# Patient Record
Sex: MALE | Race: WHITE | NOT HISPANIC OR LATINO | Employment: UNEMPLOYED | URBAN - METROPOLITAN AREA
[De-identification: names, ages, dates, MRNs, and addresses within clinical notes are randomized per-mention and may not be internally consistent; named-entity substitution may affect disease eponyms.]

---

## 2024-01-23 ENCOUNTER — OFFICE VISIT (OUTPATIENT)
Dept: OTOLARYNGOLOGY | Facility: CLINIC | Age: 54
End: 2024-01-23
Payer: COMMERCIAL

## 2024-01-23 ENCOUNTER — CLINICAL SUPPORT (OUTPATIENT)
Dept: AUDIOLOGY | Facility: CLINIC | Age: 54
End: 2024-01-23
Payer: COMMERCIAL

## 2024-01-23 VITALS — WEIGHT: 205 LBS | BODY MASS INDEX: 32.95 KG/M2 | HEIGHT: 66 IN

## 2024-01-23 DIAGNOSIS — H90.3 ASYMMETRIC SNHL (SENSORINEURAL HEARING LOSS): Primary | ICD-10-CM

## 2024-01-23 DIAGNOSIS — H69.91 DYSFUNCTION OF RIGHT EUSTACHIAN TUBE: ICD-10-CM

## 2024-01-23 DIAGNOSIS — H61.21 EXCESSIVE EAR WAX, RIGHT: ICD-10-CM

## 2024-01-23 DIAGNOSIS — H90.3 SENSORINEURAL HEARING LOSS (SNHL) OF BOTH EARS: Primary | ICD-10-CM

## 2024-01-23 PROBLEM — S21.132A: Status: ACTIVE | Noted: 2023-05-30

## 2024-01-23 PROBLEM — I25.10 CORONARY ARTERY DISEASE INVOLVING NATIVE CORONARY ARTERY OF NATIVE HEART WITHOUT ANGINA PECTORIS: Status: ACTIVE | Noted: 2023-06-15

## 2024-01-23 PROBLEM — I11.0 BENIGN HYPERTENSIVE HEART DISEASE WITH HEART FAILURE (MULTI): Status: ACTIVE | Noted: 2023-06-15

## 2024-01-23 PROBLEM — R45.7 EMOTIONAL STRESS: Status: ACTIVE | Noted: 2023-05-31

## 2024-01-23 PROBLEM — I50.23 ACUTE ON CHRONIC SYSTOLIC HEART FAILURE (MULTI): Status: ACTIVE | Noted: 2023-06-15

## 2024-01-23 PROCEDURE — 92550 TYMPANOMETRY & REFLEX THRESH: CPT | Performed by: AUDIOLOGIST

## 2024-01-23 PROCEDURE — 92557 COMPREHENSIVE HEARING TEST: CPT | Performed by: AUDIOLOGIST

## 2024-01-23 PROCEDURE — 99204 OFFICE O/P NEW MOD 45 MIN: CPT | Performed by: STUDENT IN AN ORGANIZED HEALTH CARE EDUCATION/TRAINING PROGRAM

## 2024-01-23 RX ORDER — CETIRIZINE HYDROCHLORIDE 10 MG/1
10 TABLET, CHEWABLE ORAL DAILY
COMMUNITY

## 2024-01-23 RX ORDER — VARENICLINE TARTRATE 0.5 (11)-1
0.5 KIT ORAL 2 TIMES DAILY
COMMUNITY
Start: 2024-01-22

## 2024-01-23 RX ORDER — AMLODIPINE BESYLATE 2.5 MG/1
2.5 TABLET ORAL
COMMUNITY
Start: 2023-10-13 | End: 2024-10-12

## 2024-01-23 RX ORDER — CARVEDILOL 12.5 MG/1
12.5 TABLET ORAL
COMMUNITY
Start: 2024-01-22 | End: 2025-01-21

## 2024-01-23 RX ORDER — LOSARTAN POTASSIUM 50 MG/1
50 TABLET ORAL
COMMUNITY
Start: 2023-10-13 | End: 2024-10-12

## 2024-01-23 NOTE — PROGRESS NOTES
HPI  Jourdan Frausto is a 53 y.o. male presenting for initial evaluation of hearing loss.  The patient reports developing bilateral progressive hearing loss for years, has difficulty understanding speech in high background noise environments.  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  Denies ear pain, vertigo, discharge from ear, tinnitus, aural fullness or autophony.   Denies history of prior ear surgery.      History reviewed. No pertinent past medical history.    History reviewed. No pertinent surgical history.      Current Outpatient Medications on File Prior to Visit   Medication Sig Dispense Refill    amLODIPine (Norvasc) 2.5 mg tablet Take 1 tablet (2.5 mg) by mouth once daily.      carvedilol (Coreg) 12.5 mg tablet Take 1 tablet (12.5 mg) by mouth 2 times a day with meals.      losartan (Cozaar) 50 mg tablet Take 1 tablet (50 mg) by mouth once daily.      varenicline (Chantix BRAYDON) 0.5 mg (11)- 1 mg (42) tablet Take 0.5 mg by mouth 2 times a day.      cetirizine (ZyrTEC) 10 mg chewable tablet Chew 1 tablet (10 mg) once daily.       No current facility-administered medications on file prior to visit.        No Known Allergies     Review of Systems  A detailed 12 point ROS was performed and is negative except as noted in the intake form, HPI and/or Past Medical History        Physical Exam   CONSTITUTIONAL: Well developed, well nourished.  VOICE: Normal voice quality  RESPIRATION: Breathing comfortably, no stridor.  CV: No clubbing/cyanosis/edema in hands.  EYES: EOM Intact, sclera normal.  NEURO: Alert and oriented times 3, Cranial nerves V,VII intact and symmetric bilaterally.  HEAD AND FACE: Symmetric facial features, no masses or lesions, sinuses nontender to palpation.  SALIVARY GLANDS: Parotid and submandibular glands normal bilaterally.  + EARS: Normal external ears  Right EAC with cerumen obstruction (removed)  Right EAC patent, tympanic membrane intact  Left EAC patent,  tympanic membrane intact  NOSE: External nose midline, anterior rhinoscopy is normal with limited visualization to the anterior aspect of the interior turbinates. No lesions noted.  ORAL CAVITY/OROPHARYNX/LIPS: Normal mucous membranes, normal floor of mouth/tongue/OP, no masses or lesions are noted.  PHARYNGEAL WALLS AND NASOPHARYNX: No masses noted. Mucosa appears clean and moist  NECK/LYMPH: No LAD, no thyroid masses. Trachea palpably midline  SKIN: Neck skin is without injury  PSYCH: Alert and oriented with appropriate mood and affect     Procedure: Removal of impacted cerumen, right  Indication: Cerumen impaction.   The procedure was reviewed and explained.  Verbal consent was obtained.  With the use of the otoscope the right ear was examined, cerumen was cleaned with the use of curettes.  The patient tolerated the procedure well.       Results   Audiogram performed today reviewed  Right normal hearing up to 4000 Hz sloping to severe sensorineural hearing loss.  Speech discrimination score 100%.  Type C tympanogram.  Left: Normal hearing up to 3000 Hz sloping to severe sensorineural hearing loss.  Asymmetry noted at 4006 1000 Hz.  Speech discrimination score 96%.  Type a tympanogram.        Assessment  Asymmetric sensorineural hearing loss    Plan  54-year-old male presenting for evaluation of bilateral progressive hearing loss.  Audiogram performed today notable for high-frequency asymmetry.  The condition was reviewed and explained, multiple diagnostic modalities discussed, we will proceed with ABR for evaluation of retrocochlear pathology.  RTC 4w

## 2024-01-23 NOTE — PROGRESS NOTES
COMPREHENSIVE AUDIOMETRIC EVALUATION      Name:  Jourdan Frausto  :  1970  Age:  53 y.o.  Date of Evaluation:  24   Referring Provider:  Dr. Long     History:  Mr. Frausto was seen today  for an evaluation of hearing.  Patient reported concern for hearing loss occurring gradually over time.  Patient reported hearing sensitivity in the left is better than in the right.  When asked, patient denied otalgia, aural fullness, and tinnitus    See audiometric evaluation at end of this report or scanned under media tab    OTOSCOPY:       Right Ear: Minimal non-occluding cerumen       Left Ear: Minimal non-occluding cerumen    226 Hz TYMPANOMETRY:       Right Ear: Type C: Negative pressure with normal compliance and ear canal volume, consistent with eustachian tube dysfunction       Left Ear: Type A: normal peak pressure, compliance, and ear canal volume, consistent with middle ear function within normal limits    AUDIOMETRIC EVALUATION (Phones, Checked with inserts):       Right Ear: Mild rising to WNL through 3000 Hz sloping to Severe, Sensorineural hearing loss                 Left Ear: Mild rising to WNL through 3000 Hz sloping to Severe, Sensorineural hearing loss       NOTE: Asymmetry at 6000 Hz        Test technique:  Standard Audiometry  Reliability:   good    SPEECH RECOGNITION THRESHOLD:       Right Ear:  20 dBHL in good agreement with PTA       Left Ear:  10 dBHL in good agreement with PTA    WORD RECOGNITION:       Right Ear:  excellent (100%) at normal presentation level       Left Ear:  excellent (96%) at normal presentation level    IPSILATERAL ACOUSTIC REFLEXES:       Right Ear:  Essentially Absent from 500-2000 Hz, with the exception of present at 100 dB HL at 1000 Hz, which may be consistent with concerns for retrocochlear involvement or right middle ear involvement       Left Ear:    Absent from 500-2000 Hz which may be consistent with concerns for retrocochlear involvement or borderline left  middle ear involvement    CONTRALATERAL REFLEXES:       Probe Right (Stimulus Left):  Absent from 500-2000 Hz which may be consistent with concerns for retrocochlear involvement or right middle ear involvement       Probe Left (Stimulus Right):    Absent from 500-2000 Hz which may be consistent with concerns for retrocochlear involvement or right middle ear involvement    ACOUSTIC REFLEX DECAY:       Probe Right (Stimulus Left): CNT       Probe Left (Stimulus Right): CNT    DISCUSSION:   Discussed results and recommendations with patient.  Questions were addressed and the patient was encouraged to contact our department should concerns arise.    RECOMMENDATIONS:  -Recommend further investigation into clinically significant asymmetry  -Recommend patient return following any medical management for repeated evaluation and repetition of acoustic reflexes    Ryan Stark, CCC-A     Appt: 8:30 - 9:00 AM

## 2024-03-05 ENCOUNTER — CLINICAL SUPPORT (OUTPATIENT)
Dept: AUDIOLOGY | Facility: CLINIC | Age: 54
End: 2024-03-05
Payer: MEDICAID

## 2024-03-05 ENCOUNTER — OFFICE VISIT (OUTPATIENT)
Dept: OTOLARYNGOLOGY | Facility: CLINIC | Age: 54
End: 2024-03-05
Payer: MEDICAID

## 2024-03-05 VITALS — BODY MASS INDEX: 33.75 KG/M2 | HEIGHT: 66 IN | WEIGHT: 210 LBS

## 2024-03-05 DIAGNOSIS — K13.79 LESION OF SOFT PALATE: ICD-10-CM

## 2024-03-05 DIAGNOSIS — J39.2 OROPHARYNGEAL LESION: ICD-10-CM

## 2024-03-05 DIAGNOSIS — H90.3 ASYMMETRIC SNHL (SENSORINEURAL HEARING LOSS): Primary | ICD-10-CM

## 2024-03-05 DIAGNOSIS — H90.3 SENSORINEURAL HEARING LOSS (SNHL) OF BOTH EARS: Primary | ICD-10-CM

## 2024-03-05 PROBLEM — E78.5 HLD (HYPERLIPIDEMIA): Status: ACTIVE | Noted: 2023-06-02

## 2024-03-05 PROBLEM — R73.9 ELEVATED SERUM GLUCOSE: Status: ACTIVE | Noted: 2023-05-30

## 2024-03-05 PROBLEM — R06.02 SHORTNESS OF BREATH: Status: ACTIVE | Noted: 2024-02-29

## 2024-03-05 PROCEDURE — 4004F PT TOBACCO SCREEN RCVD TLK: CPT | Performed by: STUDENT IN AN ORGANIZED HEALTH CARE EDUCATION/TRAINING PROGRAM

## 2024-03-05 PROCEDURE — 92653 AEP NEURODIAGNOSTIC I&R: CPT | Performed by: AUDIOLOGIST

## 2024-03-05 PROCEDURE — 92588 EVOKED AUDITORY TST COMPLETE: CPT | Performed by: AUDIOLOGIST

## 2024-03-05 PROCEDURE — 92567 TYMPANOMETRY: CPT | Performed by: AUDIOLOGIST

## 2024-03-05 PROCEDURE — 99214 OFFICE O/P EST MOD 30 MIN: CPT | Performed by: STUDENT IN AN ORGANIZED HEALTH CARE EDUCATION/TRAINING PROGRAM

## 2024-03-05 PROCEDURE — 31575 DIAGNOSTIC LARYNGOSCOPY: CPT | Performed by: STUDENT IN AN ORGANIZED HEALTH CARE EDUCATION/TRAINING PROGRAM

## 2024-03-05 RX ORDER — ROSUVASTATIN CALCIUM 40 MG/1
40 TABLET, COATED ORAL DAILY
COMMUNITY
Start: 2024-02-28 | End: 2025-02-27

## 2024-03-05 RX ORDER — OMEPRAZOLE 20 MG/1
20 CAPSULE, DELAYED RELEASE ORAL
COMMUNITY

## 2024-03-05 NOTE — PROGRESS NOTES
NEURODIAGNOSTIC AUDITORY BRAINSTEM RESPONSE (ABR) EVALUATION    Name:  Jourdan Frausto  :  1970  Age:  53 y.o.  Date of Evaluation:  24   Referring Physician: Dr. Long      DISCUSSION:  Results of today's neurodiagnostic evaluation were ABNORMAL, which may warrant further investigation into retrocochlear pathology.    OTOSCOPY:  Right: Clear but red canal  Left: Clear but red canal    226 Hz TYMPANOMETRY:       Right Ear: Type A: normal peak pressure, compliance, and ear canal volume, consistent with middle ear function within normal limits       Left Ear: Type A: normal peak pressure, compliance, and ear canal volume, consistent with middle ear function within normal limits    Probe Ear/Tone: 226 Right Left   Tympanogram Type A A   Peak Pressure in daPa -79 -64   Static Compliance in ml 0.49 0.28   Equivalent Canal Volume 1.0 1.4     NOTE: Tympanometry conducted due to abnormal findings and type C tympanometry at last evaluation; today's findings are INCONSISTENT  with conductive pathology    DISTORTION PRODUCT OTOACOUSTIC EMISSIONS (DPOAEs):  Right: Present and robust from 1500 to 4000 Hz and 8000 Hz; largely consistent with patent behavioral hearing sensitivity  Left:  Present and robust from 1500 to 4000 Hz; largely consistent with patent behavioral hearing sensitivity    NOTE: Present DPOAEs are typically consistent with no greater than a mild hearing loss and OHC function of the cochlea within normal limits; interpret absent low frequency DPOAEs with caution due to high level of biologic noise.    AUDITORY BRAINSTEM RESPONSE  Alternating stimuli; Rate 27.7/s; 90 dB nHL and 80 dB nHL    Right: Wave V outside of normal absolute latency, interpeak latencies, and fair morphology  Left: Wave V outside of normal absolute latency, interpeak latencies, and fair morphology    NOTE: Wave I slightly late though borderline within normal limits, elongation of interpeak latencies from I-III  binaurally.    RECOMMENDATIONS:  -Recommend further investigation to rule out retrocochlear involvement    Ryan Stark, CCC-A      Appt time: 10:00 - 10:45 AM

## 2024-03-05 NOTE — PROGRESS NOTES
HPI  3/5/24  Patient present for follow-up, JADE performed today was abnormal, reviewed and discussed with the patient.  Recall   Jourdan Frausto is a 53 y.o. male presenting for initial evaluation of hearing loss.  The patient reports developing bilateral progressive hearing loss for years, has difficulty understanding speech in high background noise environments.  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  Denies ear pain, vertigo, discharge from ear, tinnitus, aural fullness or autophony.   Denies history of prior ear surgery.      History reviewed. No pertinent past medical history.    History reviewed. No pertinent surgical history.      Current Outpatient Medications on File Prior to Visit   Medication Sig Dispense Refill    amLODIPine (Norvasc) 2.5 mg tablet Take 1 tablet (2.5 mg) by mouth once daily.      carvedilol (Coreg) 12.5 mg tablet Take 1 tablet (12.5 mg) by mouth 2 times a day with meals.      cetirizine (ZyrTEC) 10 mg chewable tablet Chew 1 tablet (10 mg) once daily.      losartan (Cozaar) 50 mg tablet Take 1 tablet (50 mg) by mouth once daily.      omeprazole (PriLOSEC) 20 mg DR capsule Take 1 capsule (20 mg) by mouth once daily in the morning. Take before meals.      rosuvastatin (Crestor) 40 mg tablet Take 1 tablet (40 mg) by mouth once daily.      varenicline (Chantix BRAYDON) 0.5 mg (11)- 1 mg (42) tablet Take 0.5 mg by mouth 2 times a day.       No current facility-administered medications on file prior to visit.        Allergies   Allergen Reactions    Bee Venom Protein (Honey Bee) Unknown        Review of Systems  A detailed 12 point ROS was performed and is negative except as noted in the intake form, HPI and/or Past Medical History        Physical Exam   CONSTITUTIONAL: Well developed, well nourished.  VOICE: Normal voice quality  RESPIRATION: Breathing comfortably, no stridor.  CV: No clubbing/cyanosis/edema in hands.  EYES: EOM Intact, sclera normal.  NEURO:  Alert and oriented times 3, Cranial nerves V,VII intact and symmetric bilaterally.  HEAD AND FACE: Symmetric facial features, no masses or lesions, sinuses nontender to palpation.  SALIVARY GLANDS: Parotid and submandibular glands normal bilaterally.  + EARS: Normal external ears  Right EAC with cerumen obstruction (removed)  Right EAC patent, tympanic membrane intact  Left EAC patent, tympanic membrane intact  NOSE: External nose midline, anterior rhinoscopy is normal with limited visualization to the anterior aspect of the interior turbinates. No lesions noted.  ORAL CAVITY/OROPHARYNX/LIPS: Normal mucous membranes, normal floor of mouth/tongue, no masses or lesions are noted.  + Papillomatous lesion along the left soft palate/lateral aspect of the uvula.  PHARYNGEAL WALLS AND NASOPHARYNX: No masses noted. Mucosa appears clean and moist  NECK/LYMPH: No LAD, no thyroid masses. Trachea palpably midline  SKIN: Neck skin is without injury  PSYCH: Alert and oriented with appropriate mood and affect     PROCEDURE NOTE:  FLEXIBLE NASOLARYNGOSCOPY     The risks, benefits, and alternatives were explained.  The patient wished to proceed and provided verbal consent.       INDICATIONS: To visualize anatomy in specific detail; evaluation of symptomatic disorder involving the voice, swallowing, or upper aerodigestive tract, including MYA.      DESCRIPTION OF PROCEDURE: After adequate afrin and lidocaine spray, I advanced the endoscope into the nasal cavity.  The nasal cavity, nasopharynx, tongue base, vallecula, posterior/lateral pharyngeal walls, pyriform, epiglottis, post cricoid area, and larynx were within normal limits.  The following specific findings should be noted:  No lesions/masses/ulcerations  Mobile TVCs bilaterally  No pooling of secretions     The patient tolerated the procedure without difficulty.      Results   Audiogram 1/23/24 reviewed  Right normal hearing up to 4000 Hz sloping to severe sensorineural hearing  loss.  Speech discrimination score 100%.  Type C tympanogram.  Left: Normal hearing up to 3000 Hz sloping to severe sensorineural hearing loss.  Asymmetry noted at 4006 1000 Hz.  Speech discrimination score 96%.  Type a tympanogram.  ABR 3/5/24 reviewed: Abnormal bilaterally.        Assessment  Asymmetric sensorineural hearing loss  Left oropharyngeal lesion    Plan  - Asymmetric sensorineural hearing loss  Audiogram1/23/24 notable for high-frequency asymmetry.    ABR 3/5/2024: Abnormal. We will proceed with MRI IAC to evaluate for retrocochlear pathology.    - Left oropharyngeal lesion  Papillomatous lesion noted along the lateral aspect of the uvula/left soft palate.  The condition was reviewed and explained, multiple treatment alternatives, risks and benefits discussed, the patient would like to proceed with excision.  States he will like to avoid general anesthesia, we will plan for excision in clinic.  The procedure was reviewed and explained, benefits, alternatives and risks discussed. Bleeding, infection, nerve injury resulting in numbness or weakness, VPI, scarring were discussed as possible risks. No guarantees were implied and the possibility of recurrence was discussed and understood.    RTC 6w

## 2024-03-21 ENCOUNTER — HOSPITAL ENCOUNTER (OUTPATIENT)
Dept: RADIOLOGY | Facility: CLINIC | Age: 54
Discharge: HOME | End: 2024-03-21
Payer: COMMERCIAL

## 2024-03-21 DIAGNOSIS — H90.3 ASYMMETRIC SNHL (SENSORINEURAL HEARING LOSS): ICD-10-CM

## 2024-03-21 PROCEDURE — A9575 INJ GADOTERATE MEGLUMI 0.1ML: HCPCS | Performed by: STUDENT IN AN ORGANIZED HEALTH CARE EDUCATION/TRAINING PROGRAM

## 2024-03-21 PROCEDURE — 70553 MRI BRAIN STEM W/O & W/DYE: CPT

## 2024-03-21 PROCEDURE — 70553 MRI BRAIN STEM W/O & W/DYE: CPT | Performed by: RADIOLOGY

## 2024-03-21 PROCEDURE — 2550000001 HC RX 255 CONTRASTS: Performed by: STUDENT IN AN ORGANIZED HEALTH CARE EDUCATION/TRAINING PROGRAM

## 2024-03-21 RX ORDER — GADOTERATE MEGLUMINE 376.9 MG/ML
0.2 INJECTION INTRAVENOUS
Status: COMPLETED | OUTPATIENT
Start: 2024-03-21 | End: 2024-03-21

## 2024-03-21 RX ADMIN — GADOTERATE MEGLUMINE 20 ML: 376.9 INJECTION INTRAVENOUS at 14:55

## 2024-05-03 ENCOUNTER — OFFICE VISIT (OUTPATIENT)
Dept: OTOLARYNGOLOGY | Facility: CLINIC | Age: 54
End: 2024-05-03
Payer: COMMERCIAL

## 2024-05-03 VITALS — WEIGHT: 230 LBS | HEIGHT: 66 IN | BODY MASS INDEX: 36.96 KG/M2

## 2024-05-03 DIAGNOSIS — K13.79 LESION OF SOFT PALATE: ICD-10-CM

## 2024-05-03 DIAGNOSIS — J39.2 OROPHARYNGEAL LESION: Primary | ICD-10-CM

## 2024-05-03 DIAGNOSIS — H90.3 ASYMMETRIC SNHL (SENSORINEURAL HEARING LOSS): ICD-10-CM

## 2024-05-03 PROBLEM — H61.20 EXCESSIVE CERUMEN IN EAR CANAL: Status: ACTIVE | Noted: 2024-05-03

## 2024-05-03 PROCEDURE — 99214 OFFICE O/P EST MOD 30 MIN: CPT | Performed by: STUDENT IN AN ORGANIZED HEALTH CARE EDUCATION/TRAINING PROGRAM

## 2024-05-03 PROCEDURE — 42104 EXCISION LESION MOUTH ROOF: CPT | Performed by: STUDENT IN AN ORGANIZED HEALTH CARE EDUCATION/TRAINING PROGRAM

## 2024-05-03 PROCEDURE — 88305 TISSUE EXAM BY PATHOLOGIST: CPT | Performed by: DENTIST

## 2024-05-03 PROCEDURE — 4004F PT TOBACCO SCREEN RCVD TLK: CPT | Performed by: STUDENT IN AN ORGANIZED HEALTH CARE EDUCATION/TRAINING PROGRAM

## 2024-05-03 PROCEDURE — 88305 TISSUE EXAM BY PATHOLOGIST: CPT

## 2024-05-03 RX ORDER — CHLORHEXIDINE GLUCONATE ORAL RINSE 1.2 MG/ML
15 SOLUTION DENTAL 3 TIMES DAILY
Qty: 225 ML | Refills: 0 | Status: SHIPPED | OUTPATIENT
Start: 2024-05-03 | End: 2024-05-08

## 2024-05-03 NOTE — PROGRESS NOTES
HPI  5/3/24  The patient present for follow-up, would like to proceed with excisional biopsy of left oropharyngeal lesion.  MRI IAC reviewed and discussed with the patient, no CPA/IAC lesions or masses noted.  Vascular loops noted adjacent to right CN 7/ 8 complex.   3/5/24  Patient present for follow-up, JADE performed today was abnormal, reviewed and discussed with the patient.  Recall   Jourdan Frausto is a 53 y.o. male presenting for initial evaluation of hearing loss.  The patient reports developing bilateral progressive hearing loss for years, has difficulty understanding speech in high background noise environments.  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  Denies ear pain, vertigo, discharge from ear, tinnitus, aural fullness or autophony.   Denies history of prior ear surgery.      History reviewed. No pertinent past medical history.    History reviewed. No pertinent surgical history.      Current Outpatient Medications on File Prior to Visit   Medication Sig Dispense Refill    amLODIPine (Norvasc) 2.5 mg tablet Take 1 tablet (2.5 mg) by mouth once daily.      carvedilol (Coreg) 12.5 mg tablet Take 1 tablet (12.5 mg) by mouth 2 times a day with meals.      cetirizine (ZyrTEC) 10 mg chewable tablet Chew 1 tablet (10 mg) once daily.      losartan (Cozaar) 50 mg tablet Take 1 tablet (50 mg) by mouth once daily.      omeprazole (PriLOSEC) 20 mg DR capsule Take 1 capsule (20 mg) by mouth once daily in the morning. Take before meals.      rosuvastatin (Crestor) 40 mg tablet Take 1 tablet (40 mg) by mouth once daily.      varenicline (Chantix BRAYDON) 0.5 mg (11)- 1 mg (42) tablet Take 0.5 mg by mouth 2 times a day.       No current facility-administered medications on file prior to visit.        Allergies   Allergen Reactions    Bee Venom Protein (Honey Bee) Unknown        Review of Systems  A detailed 12 point ROS was performed and is negative except as noted in the intake form, HPI  and/or Past Medical History        Physical Exam   CONSTITUTIONAL: Well developed, well nourished.  VOICE: Normal voice quality  RESPIRATION: Breathing comfortably, no stridor.  CV: No clubbing/cyanosis/edema in hands.  EYES: EOM Intact, sclera normal.  NEURO: Alert and oriented times 3, Cranial nerves V,VII intact and symmetric bilaterally.  HEAD AND FACE: Symmetric facial features, no masses or lesions, sinuses nontender to palpation.  SALIVARY GLANDS: Parotid and submandibular glands normal bilaterally.  + EARS: Normal external ears  Right EAC with cerumen obstruction (removed)  Right EAC patent, tympanic membrane intact  Left EAC patent, tympanic membrane intact  NOSE: External nose midline, anterior rhinoscopy is normal with limited visualization to the anterior aspect of the interior turbinates. No lesions noted.  ORAL CAVITY/OROPHARYNX/LIPS: Normal mucous membranes, normal floor of mouth/tongue, no masses or lesions are noted.  + Papillomatous lesion along the left soft palate/lateral aspect of the uvula.  PHARYNGEAL WALLS AND NASOPHARYNX: No masses noted. Mucosa appears clean and moist  NECK/LYMPH: No LAD, no thyroid masses. Trachea palpably midline  SKIN: Neck skin is without injury  PSYCH: Alert and oriented with appropriate mood and affect     Procedure:  Excisional biopsy of left oropharyngeal lesion.  Multiple treatment alternatives, risk and benefits discussed, the patient will like to proceed with excisional biopsy. Consent was obtained.  The area was anesthetized with 1% lidocaine with 1: 100,000 epinephrine.  The lesion was grasped and retracted laterally, with the use of sharp instrumentation the lesion was excised.  Specimen was sent to pathology for analysis.  Hemostasis was achieved with silver nitrate.  The patient tolerated the procedure well.        Results/ data reviewed:   MRI 3/21/24 reviewed: No CPA/IAC lesions or masses.  Vascular loop proximal to the cisternal segment of right CN 7/ 8  noted.   Audiogram 1/23/24 reviewed  Right normal hearing up to 4000 Hz sloping to severe sensorineural hearing loss.  Speech discrimination score 100%.  Type C tympanogram.  Left: Normal hearing up to 3000 Hz sloping to severe sensorineural hearing loss.  Asymmetry noted at 4006 1000 Hz.  Speech discrimination score 96%.  Type a tympanogram.  ABR 3/5/24 reviewed: Abnormal bilaterally.        Assessment  Asymmetric sensorineural hearing loss  Left oropharyngeal lesion    Plan  - Asymmetric sensorineural hearing loss  Audiogram1/23/24 notable for high-frequency asymmetry.    ABR 3/5/2024: Abnormal.   MRI IAC 3/5/2024: No CPA/IAC lesions or masses  We will monitor his hearing with yearly audiograms    - Left oropharyngeal lesion  Excisional biopsy of papillomatous lesion on the left oropharynx performed today.    Peridex prescribed    RTC 2m

## 2024-05-09 LAB
LABORATORY COMMENT REPORT: NORMAL
PATH REPORT.FINAL DX SPEC: NORMAL
PATH REPORT.GROSS SPEC: NORMAL
PATH REPORT.RELEVANT HX SPEC: NORMAL
PATH REPORT.TOTAL CANCER: NORMAL

## 2024-07-08 ENCOUNTER — APPOINTMENT (OUTPATIENT)
Dept: OTOLARYNGOLOGY | Facility: CLINIC | Age: 54
End: 2024-07-08
Payer: COMMERCIAL

## 2024-08-05 ENCOUNTER — APPOINTMENT (OUTPATIENT)
Dept: OTOLARYNGOLOGY | Facility: CLINIC | Age: 54
End: 2024-08-05
Payer: COMMERCIAL

## 2024-08-05 VITALS — BODY MASS INDEX: 36.96 KG/M2 | HEIGHT: 66 IN | WEIGHT: 230 LBS

## 2024-08-05 DIAGNOSIS — H90.3 ASYMMETRIC SNHL (SENSORINEURAL HEARING LOSS): ICD-10-CM

## 2024-08-05 DIAGNOSIS — J39.2 OROPHARYNGEAL LESION: Primary | ICD-10-CM

## 2024-08-05 PROCEDURE — 4004F PT TOBACCO SCREEN RCVD TLK: CPT | Performed by: STUDENT IN AN ORGANIZED HEALTH CARE EDUCATION/TRAINING PROGRAM

## 2024-08-05 PROCEDURE — 99213 OFFICE O/P EST LOW 20 MIN: CPT | Performed by: STUDENT IN AN ORGANIZED HEALTH CARE EDUCATION/TRAINING PROGRAM

## 2024-08-05 PROCEDURE — 3008F BODY MASS INDEX DOCD: CPT | Performed by: STUDENT IN AN ORGANIZED HEALTH CARE EDUCATION/TRAINING PROGRAM

## 2024-08-05 RX ORDER — TIZANIDINE 4 MG/1
4 TABLET ORAL 2 TIMES DAILY PRN
COMMUNITY
Start: 2024-06-19 | End: 2024-08-18

## 2024-08-05 NOTE — PROGRESS NOTES
HPI  8/5/24  The patient present for follow-up, state he has been doing well.  No oropharyngeal complaints.  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  5/3/24  The patient present for follow-up, would like to proceed with excisional biopsy of left oropharyngeal lesion.  MRI IAC reviewed and discussed with the patient, no CPA/IAC lesions or masses noted.  Vascular loops noted adjacent to right CN 7/ 8 complex.   3/5/24  Patient present for follow-up, JADE performed today was abnormal, reviewed and discussed with the patient.  Recall   Jourdan Frausto is a 54 y.o. male presenting for initial evaluation of hearing loss.  The patient reports developing bilateral progressive hearing loss for years, has difficulty understanding speech in high background noise environments.  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  Denies ear pain, vertigo, discharge from ear, tinnitus, aural fullness or autophony.   Denies history of prior ear surgery.      History reviewed. No pertinent past medical history.    History reviewed. No pertinent surgical history.      Current Outpatient Medications on File Prior to Visit   Medication Sig Dispense Refill    amLODIPine (Norvasc) 2.5 mg tablet Take 1 tablet (2.5 mg) by mouth once daily.      carvedilol (Coreg) 12.5 mg tablet Take 1 tablet (12.5 mg) by mouth 2 times daily (morning and late afternoon).      cetirizine (ZyrTEC) 10 mg chewable tablet Chew 1 tablet (10 mg) once daily.      losartan (Cozaar) 50 mg tablet Take 1 tablet (50 mg) by mouth once daily.      omeprazole (PriLOSEC) 20 mg DR capsule Take 1 capsule (20 mg) by mouth once daily in the morning. Take before meals.      rosuvastatin (Crestor) 40 mg tablet Take 1 tablet (40 mg) by mouth once daily.      tiZANidine (Zanaflex) 4 mg tablet Take 1 tablet (4 mg) by mouth 2 times a day as needed.      varenicline (Chantix BRAYDON) 0.5 mg (11)- 1 mg (42) tablet  Take 0.5 mg by mouth 2 times a day.       No current facility-administered medications on file prior to visit.        Allergies   Allergen Reactions    Bee Venom Protein (Honey Bee) Unknown        Review of Systems  A detailed 12 point ROS was performed and is negative except as noted in the intake form, HPI and/or Past Medical History        Physical Exam   CONSTITUTIONAL: Well developed, well nourished.  VOICE: Normal voice quality  RESPIRATION: Breathing comfortably, no stridor.  CV: No clubbing/cyanosis/edema in hands.  EYES: EOM Intact, sclera normal.  NEURO: Alert and oriented times 3, Cranial nerves V,VII intact and symmetric bilaterally.  HEAD AND FACE: Symmetric facial features, no masses or lesions, sinuses nontender to palpation.  SALIVARY GLANDS: Parotid and submandibular glands normal bilaterally.  + EARS: Normal external ears  Right EAC patent, tympanic membrane intact  Left EAC patent, tympanic membrane intact  NOSE: External nose midline, anterior rhinoscopy is normal with limited visualization to the anterior aspect of the interior turbinates. No lesions noted.  ORAL CAVITY/OROPHARYNX/LIPS: Normal mucous membranes, normal floor of mouth/tongue, no masses or lesions are noted.  + Excisional site along the left soft palate/lateral aspect of the uvula well-healed  PHARYNGEAL WALLS AND NASOPHARYNX: No masses noted. Mucosa appears clean and moist  NECK/LYMPH: No LAD, no thyroid masses. Trachea palpably midline  SKIN: Neck skin is without injury  PSYCH: Alert and oriented with appropriate mood and affect     Procedure:  Excisional biopsy of left oropharyngeal lesion.  Multiple treatment alternatives, risk and benefits discussed, the patient will like to proceed with excisional biopsy. Consent was obtained.  The area was anesthetized with 1% lidocaine with 1: 100,000 epinephrine.  The lesion was grasped and retracted laterally, with the use of sharp instrumentation the lesion was excised.  Specimen was  sent to pathology for analysis.  Hemostasis was achieved with silver nitrate.  The patient tolerated the procedure well.        Results/ data reviewed:   MRI 3/21/24 reviewed: No CPA/IAC lesions or masses.  Vascular loop proximal to the cisternal segment of right CN 7/ 8 noted.   ABR 3/5/24 reviewed: Abnormal bilaterally.  Audiogram 1/23/24 reviewed:  Right normal hearing up to 4000 Hz sloping to severe sensorineural hearing loss.  Speech discrimination score 100%.  Type C tympanogram.  Left: Normal hearing up to 3000 Hz sloping to severe sensorineural hearing loss.  Asymmetry noted at 4006 1000 Hz.  Speech discrimination score 96%.  Type a tympanogram.          Assessment  Asymmetric sensorineural hearing loss  Left oropharyngeal lesion     Plan  - Asymmetric sensorineural hearing loss  Audiogram1/23/24 notable for high-frequency asymmetry.    ABR 3/5/2024: Abnormal.   MRI IAC 3/5/2024: No CPA/IAC lesions or masses  We will monitor his hearing with yearly audiograms    - Left oropharyngeal lesion s/p excisional biopsy.  Pathology consistent with squamous papilloma.  Surgical site well-healed    RTC 6m

## 2025-02-11 ENCOUNTER — APPOINTMENT (OUTPATIENT)
Dept: AUDIOLOGY | Facility: CLINIC | Age: 55
End: 2025-02-11
Payer: COMMERCIAL

## 2025-02-11 ENCOUNTER — APPOINTMENT (OUTPATIENT)
Dept: OTOLARYNGOLOGY | Facility: CLINIC | Age: 55
End: 2025-02-11
Payer: COMMERCIAL

## 2025-02-11 VITALS — WEIGHT: 230 LBS | BODY MASS INDEX: 36.96 KG/M2 | HEIGHT: 66 IN

## 2025-02-11 DIAGNOSIS — J39.2 OROPHARYNGEAL LESION: ICD-10-CM

## 2025-02-11 DIAGNOSIS — H93.13 TINNITUS OF BOTH EARS: ICD-10-CM

## 2025-02-11 DIAGNOSIS — H90.3 SENSORINEURAL HEARING LOSS (SNHL) OF BOTH EARS: Primary | ICD-10-CM

## 2025-02-11 DIAGNOSIS — H90.3 ASYMMETRIC SNHL (SENSORINEURAL HEARING LOSS): Primary | ICD-10-CM

## 2025-02-11 PROCEDURE — 99214 OFFICE O/P EST MOD 30 MIN: CPT | Performed by: STUDENT IN AN ORGANIZED HEALTH CARE EDUCATION/TRAINING PROGRAM

## 2025-02-11 PROCEDURE — 1036F TOBACCO NON-USER: CPT | Performed by: STUDENT IN AN ORGANIZED HEALTH CARE EDUCATION/TRAINING PROGRAM

## 2025-02-11 PROCEDURE — 92557 COMPREHENSIVE HEARING TEST: CPT | Performed by: AUDIOLOGIST

## 2025-02-11 PROCEDURE — 3008F BODY MASS INDEX DOCD: CPT | Performed by: STUDENT IN AN ORGANIZED HEALTH CARE EDUCATION/TRAINING PROGRAM

## 2025-02-11 PROCEDURE — 92567 TYMPANOMETRY: CPT | Performed by: AUDIOLOGIST

## 2025-02-11 RX ORDER — AMLODIPINE BESYLATE 5 MG/1
1 TABLET ORAL
COMMUNITY
Start: 2024-12-04

## 2025-02-11 RX ORDER — SERTRALINE HYDROCHLORIDE 50 MG/1
50 TABLET, FILM COATED ORAL
COMMUNITY
Start: 2025-02-04 | End: 2026-02-04

## 2025-02-11 NOTE — PROGRESS NOTES
COMPREHENSIVE AUDIOMETRIC EVALUATION      Name:  Jourdan Frausto  :  1970  Age:  54 y.o.  Date of Evaluation:  25   Referring Provider:   Mayo Lozano MD       History:  Mr. Frausto was seen today for an evaluation of hearing.  Patient reported tinnitus, bilaterally, and family concerns for hearing.  When asked, patient denied otalgia, aural fullness, and concerns for decreased hearing sensitivity    See audiometric evaluation at end of this report or scanned under media tab    OTOSCOPY:       Right Ear: Clear but red canal       Left Ear: Clear but red canal    226 Hz TYMPANOMETRY:       Right Ear: Type A: normal peak pressure, compliance, and ear canal volume, consistent with middle ear function within normal limits       Left Ear: Type A: normal peak pressure, compliance, and ear canal volume, consistent with middle ear function within normal limits    AUDIOMETRIC EVALUATION (Inserts):       Right Ear: Normal through 3000 Hz sloping to Severe, Sensorineural hearing loss                 Left Ear:  Normal through 3000 Hz sloping to moderately severe, Sensorineural hearing loss           NOTE: Hearing sensitivity consistent with most recent audiometric evaluation 2024  NOTE: Clinically significant asymmetry at 6000 Hz consistent with previous evaluations    Test technique:  Standard Audiometry  Reliability:   good    SPEECH RECOGNITION THRESHOLD:       Right Ear:  10 dBHL in good agreement with PTA       Left Ear:  15 dBHL in good agreement with PTA    WORD RECOGNITION:       Right Ear:  100%  excellent (%) at normal presentation level       Left Ear:   100%  excellent (%) at normal presentation level    DISCUSSION:   Discussed results and recommendations with patient.  Questions were addressed and the patient was encouraged to contact our department should concerns arise.    RECOMMENDATIONS:  -Recommend patient return for repeated audiometric evaluation should concerns for changes  in hearing sensitivity arise or as medically indicated.    Ryan Stark, CCC-A     Appt: 2:00 - 2:20 PM

## 2025-02-11 NOTE — PROGRESS NOTES
HPI  2/11/25  The patient present for follow-up, denies hearing changes.    8/5/24  The patient present for follow-up, state he has been doing well.  No oropharyngeal complaints.  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  5/3/24  The patient present for follow-up, would like to proceed with excisional biopsy of left oropharyngeal lesion.  MRI IAC reviewed and discussed with the patient, no CPA/IAC lesions or masses noted.  Vascular loops noted adjacent to right CN 7/ 8 complex.   Recall   Jourdan Frausto is a 54 y.o. male presenting for initial evaluation of hearing loss.  The patient reports developing bilateral progressive hearing loss for years, has difficulty understanding speech in high background noise environments.  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  Denies ear pain, vertigo, discharge from ear, tinnitus, aural fullness or autophony.   Denies history of prior ear surgery.      History reviewed. No pertinent past medical history.    History reviewed. No pertinent surgical history.      Current Outpatient Medications on File Prior to Visit   Medication Sig Dispense Refill    amLODIPine (Norvasc) 2.5 mg tablet Take 1 tablet (2.5 mg) by mouth once daily.      carvedilol (Coreg) 12.5 mg tablet Take 1 tablet (12.5 mg) by mouth 2 times daily (morning and late afternoon).      cetirizine (ZyrTEC) 10 mg chewable tablet Chew 1 tablet (10 mg) once daily.      losartan (Cozaar) 50 mg tablet Take 1 tablet (50 mg) by mouth once daily.      omeprazole (PriLOSEC) 20 mg DR capsule Take 1 capsule (20 mg) by mouth once daily in the morning. Take before meals.      rosuvastatin (Crestor) 40 mg tablet Take 1 tablet (40 mg) by mouth once daily.      tiZANidine (Zanaflex) 4 mg tablet Take 1 tablet (4 mg) by mouth 2 times a day as needed.      varenicline (Chantix BRAYDON) 0.5 mg (11)- 1 mg (42) tablet Take 0.5 mg by mouth 2 times a day.       No  current facility-administered medications on file prior to visit.        Allergies   Allergen Reactions    Bee Venom Protein (Honey Bee) Unknown        Review of Systems  A detailed 12 point ROS was performed and is negative except as noted in the intake form, HPI and/or Past Medical History        Physical Exam   CONSTITUTIONAL: Well developed, well nourished.  VOICE: Normal voice quality  RESPIRATION: Breathing comfortably, no stridor.  CV: No clubbing/cyanosis/edema in hands.  EYES: EOM Intact, sclera normal.  NEURO: Alert and oriented times 3, Cranial nerves V,VII intact and symmetric bilaterally.  HEAD AND FACE: Symmetric facial features, no masses or lesions, sinuses nontender to palpation.  SALIVARY GLANDS: Parotid and submandibular glands normal bilaterally.  + EARS: Normal external ears  Right EAC patent, tympanic membrane intact  Left EAC patent, tympanic membrane intact  NOSE: External nose midline, anterior rhinoscopy is normal with limited visualization to the anterior aspect of the interior turbinates. No lesions noted.  ORAL CAVITY/OROPHARYNX/LIPS: Normal mucous membranes, normal floor of mouth/tongue, no masses or lesions are noted.  + Excisional site along the left soft palate/lateral aspect of the uvula well-healed  PHARYNGEAL WALLS AND NASOPHARYNX: No masses noted. Mucosa appears clean and moist  NECK/LYMPH: No LAD, no thyroid masses. Trachea palpably midline  SKIN: Neck skin is without injury  PSYCH: Alert and oriented with appropriate mood and affect     Procedure:  Excisional biopsy of left oropharyngeal lesion.  Multiple treatment alternatives, risk and benefits discussed, the patient will like to proceed with excisional biopsy. Consent was obtained.  The area was anesthetized with 1% lidocaine with 1: 100,000 epinephrine.  The lesion was grasped and retracted laterally, with the use of sharp instrumentation the lesion was excised.  Specimen was sent to pathology for analysis.  Hemostasis was  achieved with silver nitrate.  The patient tolerated the procedure well.        Results/ data reviewed:   Audiogram performed today personally reviewed   Right: Normal hearing up to 4000 Hz sloping to severe sensorineural hearing loss.  Speech discrimination score 100%.  Type A tympanogram.  Left: Normal hearing up to 4000 Hz sloping to moderately severe sensorineural hearing loss.  Speech discrimination score 100%.  As tympanogram.          Assessment  Asymmetric sensorineural hearing loss  Left oropharyngeal lesion     Plan  - Asymmetric sensorineural hearing loss  ABR 3/5/2024: Abnormal.   MRI IAC 3/5/2024: No CPA/IAC lesions or masses  Audiogram performed today notable for stable asymmetric sensorineural hearing loss.    - Left oropharyngeal lesion s/p excisional biopsy.  Pathology consistent with squamous papilloma.  Surgical site well-healed    RTC 1y

## 2026-02-17 ENCOUNTER — APPOINTMENT (OUTPATIENT)
Dept: OTOLARYNGOLOGY | Facility: CLINIC | Age: 56
End: 2026-02-17
Payer: COMMERCIAL

## 2026-02-17 ENCOUNTER — APPOINTMENT (OUTPATIENT)
Dept: AUDIOLOGY | Facility: CLINIC | Age: 56
End: 2026-02-17
Payer: COMMERCIAL